# Patient Record
Sex: MALE | Race: OTHER | NOT HISPANIC OR LATINO | URBAN - METROPOLITAN AREA
[De-identification: names, ages, dates, MRNs, and addresses within clinical notes are randomized per-mention and may not be internally consistent; named-entity substitution may affect disease eponyms.]

---

## 2021-03-04 ENCOUNTER — EMERGENCY (EMERGENCY)
Facility: HOSPITAL | Age: 56
LOS: 1 days | Discharge: HOME | End: 2021-03-04
Attending: EMERGENCY MEDICINE
Payer: MEDICAID

## 2021-03-04 VITALS
WEIGHT: 151.9 LBS | HEIGHT: 64 IN | OXYGEN SATURATION: 98 % | HEART RATE: 77 BPM | DIASTOLIC BLOOD PRESSURE: 71 MMHG | RESPIRATION RATE: 18 BRPM | SYSTOLIC BLOOD PRESSURE: 116 MMHG | TEMPERATURE: 98 F

## 2021-03-04 PROCEDURE — 73030 X-RAY EXAM OF SHOULDER: CPT | Mod: 26,RT

## 2021-03-04 PROCEDURE — 96372 THER/PROPH/DIAG INJ SC/IM: CPT

## 2021-03-04 PROCEDURE — 73030 X-RAY EXAM OF SHOULDER: CPT

## 2021-03-04 PROCEDURE — 99283 EMERGENCY DEPT VISIT LOW MDM: CPT | Mod: 25

## 2021-03-04 PROCEDURE — 99284 EMERGENCY DEPT VISIT MOD MDM: CPT

## 2021-03-04 RX ORDER — KETOROLAC TROMETHAMINE 30 MG/ML
30 SYRINGE (ML) INJECTION ONCE
Refills: 0 | Status: DISCONTINUED | OUTPATIENT
Start: 2021-03-04 | End: 2021-03-04

## 2021-03-04 RX ADMIN — Medication 20 MILLIGRAM(S): at 12:08

## 2021-03-04 RX ADMIN — Medication 30 MILLIGRAM(S): at 12:08

## 2021-03-04 NOTE — ED PROVIDER NOTE - PATIENT PORTAL LINK FT
You can access the FollowMyHealth Patient Portal offered by [facility] by registering at the following website: [email or web address]. By joining [employer]’s FollowMyHealth portal, you will also be able to view your health information using other applications (apps) compatible with our system.

## 2021-03-04 NOTE — ED PROVIDER NOTE - CARDIOVASCULAR NEGATIVE STATEMENT, MLM

## 2021-03-29 ENCOUNTER — EMERGENCY (EMERGENCY)
Facility: HOSPITAL | Age: 56
LOS: 1 days | Discharge: HOME | End: 2021-03-29
Attending: EMERGENCY MEDICINE
Payer: MEDICAID

## 2021-03-29 VITALS
WEIGHT: 156.09 LBS | DIASTOLIC BLOOD PRESSURE: 77 MMHG | SYSTOLIC BLOOD PRESSURE: 125 MMHG | HEART RATE: 74 BPM | HEIGHT: 64 IN | TEMPERATURE: 98 F | RESPIRATION RATE: 18 BRPM

## 2021-03-29 PROCEDURE — 99283 EMERGENCY DEPT VISIT LOW MDM: CPT

## 2021-03-29 RX ORDER — DIAZEPAM 5 MG
1 TABLET ORAL
Qty: 12 | Refills: 0
Start: 2021-03-29 | End: 2021-03-31

## 2021-03-29 RX ORDER — IBUPROFEN 200 MG
1 TABLET ORAL
Qty: 15 | Refills: 0
Start: 2021-03-29 | End: 2021-04-02

## 2021-03-29 NOTE — ED PROVIDER NOTE - OBJECTIVE STATEMENT
[age] y.o male with no PMHx and no PSHx presents to the ED c.o R shoulder pain and R trapezius pain . Patient states he injured himself a few weeks ago and was put on immobilization but he still has pain and doesn't know what to do. Patient denies any other acute complaints. NKDA

## 2021-03-29 NOTE — ED PROVIDER NOTE - PATIENT PORTAL LINK FT
You can access the FollowMyHealth Patient Portal offered by [facility] by registering at the following website: [email or web address]. By joining [employer]’s FollowMyHealth portal, you will also be able to view your health information using other applications (apps) compatible with our system.

## 2024-01-18 NOTE — ED ADULT TRIAGE NOTE - HEIGHT IN CM
Session ID: [id number]  Language: Greenlandic   ID: #[id number]   Name: Izaiah genitourinary, endocrinologic, hematologic, musculoskeletal and psychiatric are negative.    Past Medical History:  History reviewed. No pertinent past medical history.    Past Surgical History:  History reviewed. No pertinent surgical history.    Family History:  Family History   Problem Relation Age of Onset    Hypertension Mother     No Known Problems Father        Social History:  Social History     Socioeconomic History    Marital status:      Spouse name: Not on file    Number of children: Not on file    Years of education: Not on file    Highest education level: Not on file   Occupational History    Not on file   Tobacco Use    Smoking status: Never     Passive exposure: Past    Smokeless tobacco: Never   Vaping Use    Vaping Use: Never used   Substance and Sexual Activity    Alcohol use: Not Currently    Drug use: Never    Sexual activity: Not on file   Other Topics Concern    Not on file   Social History Narrative    Denies caffeine     Social Determinants of Health     Financial Resource Strain: Not on file   Food Insecurity: Not on file   Transportation Needs: Not on file   Physical Activity: Not on file   Stress: Not on file   Social Connections: Not on file   Intimate Partner Violence: Not on file   Housing Stability: Not on file       Allergies:  No Known Allergies    Current Medications:  Current Outpatient Medications   Medication Sig Dispense Refill    butalbital-acetaminophen-caffeine-codeine (FIORICET WITH CODEINE) -76-30 MG per capsule Take 1 capsule by mouth every 6 hours as needed for Headaches or Migraine. Max Daily Amount: 4 capsules      SUMAtriptan (IMITREX) 50 MG tablet Take 1 tablet by mouth 1 (one) time if needed for Migraine 8 tablet 1    amLODIPine (NORVASC) 5 MG tablet Take 1 tablet by mouth daily 30 tablet 0     No current facility-administered medications for this visit.         Physical Exam:  /80 (Site: Left Upper Arm, Position: Sitting, Cuff Size: Medium Adult)  162.56